# Patient Record
Sex: FEMALE | Race: WHITE | NOT HISPANIC OR LATINO | URBAN - METROPOLITAN AREA
[De-identification: names, ages, dates, MRNs, and addresses within clinical notes are randomized per-mention and may not be internally consistent; named-entity substitution may affect disease eponyms.]

---

## 2018-04-02 ENCOUNTER — OUTPATIENT (OUTPATIENT)
Dept: OUTPATIENT SERVICES | Facility: HOSPITAL | Age: 48
LOS: 1 days | Discharge: HOME | End: 2018-04-02

## 2018-04-02 VITALS
HEART RATE: 55 BPM | WEIGHT: 199.96 LBS | DIASTOLIC BLOOD PRESSURE: 93 MMHG | TEMPERATURE: 98 F | RESPIRATION RATE: 18 BRPM | SYSTOLIC BLOOD PRESSURE: 143 MMHG | HEIGHT: 62 IN

## 2018-04-02 DIAGNOSIS — K63.3 ULCER OF INTESTINE: ICD-10-CM

## 2018-04-02 DIAGNOSIS — K50.00 CROHN'S DISEASE OF SMALL INTESTINE WITHOUT COMPLICATIONS: ICD-10-CM

## 2018-04-02 RX ORDER — METOPROLOL TARTRATE 50 MG
0 TABLET ORAL
Qty: 0 | Refills: 0 | COMMUNITY

## 2018-04-02 RX ORDER — MESALAMINE 400 MG
4 TABLET, DELAYED RELEASE (ENTERIC COATED) ORAL
Qty: 0 | Refills: 0 | COMMUNITY

## 2018-04-02 NOTE — H&P ADULT - ASSESSMENT
Patient is a 48 y/o with history of IBD, specifically Crohn's disease. Patient presents for Capsule Study to check small bowel involvement. Patient aware of risk of procedure including possibility that Capsule can become stuck and require surgery. Patient wishes to proceed with study.    -Capsule Study

## 2018-04-02 NOTE — ASU DISCHARGE PLAN (ADULT/PEDIATRIC). - NOTIFY
Inability to Tolerate Liquids or Foods/Persistent Nausea and Vomiting/Pain not relieved by Medications

## 2018-04-02 NOTE — H&P ADULT - HISTORY OF PRESENT ILLNESS
Patient is a 48 y/o with history of IBD, specifically Crohn's disease. Patient presents for Capsule Study to check small bowel involvement.

## 2018-04-02 NOTE — H&P ADULT - NSHPPHYSICALEXAM_GEN_ALL_CORE
Physical Exam  Gen: NAD  HEENT: NC/AT, Mucosal Membranes  Cardio: S1/S2 No S3/S4, Regular  Resp: CTA B/L  Abdomen: Soft, ND/NT  Neuro: AAOx3  Extremities: FROM x 4

## 2022-10-18 NOTE — H&P ADULT - NSHPSOURCEINFORD_GEN_ALL_CORE
2 weeks ago we sent in a script for the patients pain medication. We had to go with a different medication due to the current medication being out of stock and on back order. The script that we sent in is not helping with her pain. Patient called the pharmacy and they now have her medication back in stock. She wants to know if she can go back to her original medication that she knows helps with her chronic pain. Script pended if approved. Patient

## 2025-07-12 NOTE — ASU PREOP CHECKLIST - TEMPERATURE IN FAHRENHEIT (DEGREES F)
Drink plenty of fluids. Include cranberry juice if desired.  Risks, benefits, side effects and usage of medications discussed with patient who expresses understanding and agreement.  Finish antibiotic as prescribed unless we instruct otherwise based on culture results.  For prevention of UTIs, always wipe from front to back when using the restroom, void before and after intercourse, and stay well-hydrated.   We will call with results of the urine culture, if it was sent, in 3-4 days.    Call in 48 hours if there is no improvement or if symptoms are worsening.         98.1